# Patient Record
Sex: FEMALE | Race: BLACK OR AFRICAN AMERICAN | NOT HISPANIC OR LATINO | Employment: UNEMPLOYED | URBAN - METROPOLITAN AREA
[De-identification: names, ages, dates, MRNs, and addresses within clinical notes are randomized per-mention and may not be internally consistent; named-entity substitution may affect disease eponyms.]

---

## 2018-08-09 DIAGNOSIS — R10.2 PELVIC PAIN IN FEMALE: Primary | ICD-10-CM

## 2018-08-24 ENCOUNTER — HOSPITAL ENCOUNTER (OUTPATIENT)
Dept: RADIOLOGY | Facility: HOSPITAL | Age: 48
Discharge: HOME | End: 2018-08-24
Attending: OBSTETRICS & GYNECOLOGY
Payer: MEDICAID

## 2018-08-24 DIAGNOSIS — R10.2 PELVIC PAIN IN FEMALE: ICD-10-CM

## 2018-08-24 PROCEDURE — 76830 TRANSVAGINAL US NON-OB: CPT | Mod: TC

## 2018-08-24 PROCEDURE — 76856 US EXAM PELVIC COMPLETE: CPT | Mod: 26,,, | Performed by: RADIOLOGY

## 2018-08-24 PROCEDURE — 76830 TRANSVAGINAL US NON-OB: CPT | Mod: 26,,, | Performed by: RADIOLOGY

## 2018-08-28 ENCOUNTER — OFFICE VISIT (OUTPATIENT)
Dept: OBSTETRICS AND GYNECOLOGY | Facility: CLINIC | Age: 48
End: 2018-08-28
Payer: MEDICAID

## 2018-08-28 VITALS
SYSTOLIC BLOOD PRESSURE: 189 MMHG | DIASTOLIC BLOOD PRESSURE: 98 MMHG | BODY MASS INDEX: 23.6 KG/M2 | WEIGHT: 125 LBS | HEIGHT: 61 IN

## 2018-08-28 DIAGNOSIS — D25.1 INTRAMURAL, SUBMUCOUS, AND SUBSEROUS LEIOMYOMA OF UTERUS: Primary | ICD-10-CM

## 2018-08-28 DIAGNOSIS — D25.2 INTRAMURAL, SUBMUCOUS, AND SUBSEROUS LEIOMYOMA OF UTERUS: Primary | ICD-10-CM

## 2018-08-28 DIAGNOSIS — D25.0 INTRAMURAL, SUBMUCOUS, AND SUBSEROUS LEIOMYOMA OF UTERUS: Primary | ICD-10-CM

## 2018-08-28 DIAGNOSIS — R10.2 PELVIC PAIN: ICD-10-CM

## 2018-08-28 PROCEDURE — 99203 OFFICE O/P NEW LOW 30 MIN: CPT | Mod: S$PBB,,, | Performed by: OBSTETRICS & GYNECOLOGY

## 2018-08-28 PROCEDURE — 99999 PR PBB SHADOW E&M-EST. PATIENT-LVL III: CPT | Mod: PBBFAC,,, | Performed by: OBSTETRICS & GYNECOLOGY

## 2018-08-28 PROCEDURE — 99213 OFFICE O/P EST LOW 20 MIN: CPT | Mod: PBBFAC | Performed by: OBSTETRICS & GYNECOLOGY

## 2018-08-28 RX ORDER — NIFEDIPINE 60 MG/1
TABLET, EXTENDED RELEASE ORAL
COMMUNITY

## 2018-08-28 RX ORDER — LISINOPRIL AND HYDROCHLOROTHIAZIDE 12.5; 2 MG/1; MG/1
TABLET ORAL
COMMUNITY

## 2018-08-28 RX ORDER — NIFEDIPINE 30 MG/1
TABLET, FILM COATED, EXTENDED RELEASE ORAL
COMMUNITY

## 2018-09-03 PROBLEM — D25.2 INTRAMURAL, SUBMUCOUS, AND SUBSEROUS LEIOMYOMA OF UTERUS: Status: ACTIVE | Noted: 2018-09-03

## 2018-09-03 PROBLEM — D25.0 INTRAMURAL, SUBMUCOUS, AND SUBSEROUS LEIOMYOMA OF UTERUS: Status: ACTIVE | Noted: 2018-09-03

## 2018-09-03 PROBLEM — D25.1 INTRAMURAL, SUBMUCOUS, AND SUBSEROUS LEIOMYOMA OF UTERUS: Status: ACTIVE | Noted: 2018-09-03

## 2018-09-03 NOTE — PROGRESS NOTES
"Cc:  Discuss pelvic sono    HPI:  48 yro G0 presents to discuss recent pelvic sono done for "pelvic pain."  Pt initially seen at [facility].  Pt speaks [place] and desires to use  as .  Pt states her pain is mostly flank and upper bilateral back, not really pelvic.  Pt does desire future pregnancy.  Pt states menses is not heavy or painful.    Sono:  FINDINGS:  Uterus measures 15 x 5.1 x 9 cm, the endometrium 4 mm.  There are 4 fibroids ranging in size from 9.2 cm to 4 cm.  Left ovary measures 4.4 x 3.8 x 4.2 cm and there is normal blood flow.  Right ovary measures 2.5 x 2.2 x 1.9 cm with normal blood flow.  There is a small amount of physiologic free fluid.  There is a simple cyst left ovary measuring 3.3 cm.      Impression     See above  Fibroid uterus otherwise no abnormality seen     Vitals:    08/28/18 1352   BP: (!) 189/98   Weight: 56.7 kg (125 lb)   Height: 5' 1" (1.549 m)     30 minute discussion  Pt states she is in fact not having any pelvic pain, no painful or heavy menses.  Pt enquiring about surgical removal of fibroids.  Discussed with pt since she is not having sx's, and likelihood of pregnancy would be very small, she truly has no indications for myomectomy.  I believe myomectomy would be of little benefit to this pt at this point in her life.    Assessment  Pelvic pain  Fibroids    Plan  No treatment needed for fibroids at this time  Pt to see PCP for better BP management  "

## 2019-07-03 ENCOUNTER — HOSPITAL ENCOUNTER (EMERGENCY)
Facility: HOSPITAL | Age: 49
Discharge: HOME | End: 2019-07-03
Attending: EMERGENCY MEDICINE
Payer: MEDICAID

## 2019-07-03 VITALS
OXYGEN SATURATION: 98 % | RESPIRATION RATE: 18 BRPM | BODY MASS INDEX: 24.56 KG/M2 | DIASTOLIC BLOOD PRESSURE: 75 MMHG | SYSTOLIC BLOOD PRESSURE: 119 MMHG | HEART RATE: 78 BPM | WEIGHT: 130 LBS | TEMPERATURE: 98 F

## 2019-07-03 DIAGNOSIS — S29.011A CHEST WALL MUSCLE STRAIN, INITIAL ENCOUNTER: Primary | ICD-10-CM

## 2019-07-03 DIAGNOSIS — M54.6 CHRONIC RIGHT-SIDED THORACIC BACK PAIN: ICD-10-CM

## 2019-07-03 DIAGNOSIS — G89.29 CHRONIC RIGHT-SIDED THORACIC BACK PAIN: ICD-10-CM

## 2019-07-03 PROCEDURE — 99284 EMERGENCY DEPT VISIT MOD MDM: CPT

## 2019-07-03 PROCEDURE — 25000003 PHARM REV CODE 250: Performed by: NURSE PRACTITIONER

## 2019-07-03 RX ORDER — METHOCARBAMOL 500 MG/1
1500 TABLET, FILM COATED ORAL
Status: COMPLETED | OUTPATIENT
Start: 2019-07-03 | End: 2019-07-03

## 2019-07-03 RX ORDER — MELOXICAM 7.5 MG/1
7.5 TABLET ORAL ONCE
Status: COMPLETED | OUTPATIENT
Start: 2019-07-03 | End: 2019-07-03

## 2019-07-03 RX ORDER — METHOCARBAMOL 750 MG/1
1500 TABLET, FILM COATED ORAL 3 TIMES DAILY
Qty: 30 TABLET | Refills: 0 | Status: SHIPPED | OUTPATIENT
Start: 2019-07-03 | End: 2019-07-08

## 2019-07-03 RX ORDER — MELOXICAM 7.5 MG/1
7.5 TABLET ORAL DAILY
Qty: 30 TABLET | Refills: 0 | Status: SHIPPED | OUTPATIENT
Start: 2019-07-03 | End: 2019-08-02

## 2019-07-03 RX ADMIN — METHOCARBAMOL TABLETS 1500 MG: 500 TABLET, COATED ORAL at 10:07

## 2019-07-03 RX ADMIN — MELOXICAM 7.5 MG: 7.5 TABLET ORAL at 10:07

## 2019-07-03 NOTE — ED TRIAGE NOTES
The pt states her right sided rib pain a few months ago. Denies fever, n/v/d, burning upon urination and vaginal discharge. Reports back pain when she is sleeping as well.

## 2019-07-03 NOTE — ED PROVIDER NOTES
Encounter Date: 7/3/2019    SCRIBE #1 NOTE: I, Gregorio Handy, am scribing for, and in the presence of, Maria Elena Juan NP. Other sections scribed: HPI, ANDERSON.       History     Chief Complaint   Patient presents with    rib pain     Reports having righ sided rib pain, and david pain for past week. Denies trauma, and SOB     This is a 49 y.o. female who presents to the ED complaining of right sided rib pain that began one month ago and became worse today (7/3/19). The pain is constant, radiates to the lower back, and described as cramping and rated 8/10 at the time of the physical exam. Denies trauma, fever, chest pain, cough, and SOB. Pain worsens when the pt sleeps, however symptoms alleviate when she rests. She has a physically active job as a . She has taken no medications for these symptoms. She has had this before but she notes the symptoms are worse this time. The pt does not smoke. Denies any drug or alcohol use. NKDA. No PSHx.    The history is provided by the spouse and medical records. The history is limited by a language barrier. No  was used.     Review of patient's allergies indicates:  No Known Allergies  Past Medical History:   Diagnosis Date    Hypertension      History reviewed. No pertinent surgical history.  History reviewed. No pertinent family history.  Social History     Tobacco Use    Smoking status: Never Smoker    Smokeless tobacco: Never Used   Substance Use Topics    Alcohol use: No     Frequency: Never    Drug use: No     Review of Systems   Constitutional: Negative for chills and fever.   HENT: Negative for congestion.    Respiratory: Negative for cough and shortness of breath.    Cardiovascular: Negative for chest pain.   Gastrointestinal: Negative for abdominal pain, nausea and vomiting.   Genitourinary: Negative for dysuria.   Musculoskeletal: Positive for back pain and myalgias.   Skin: Negative for rash.   Neurological: Negative for dizziness, syncope and  "light-headedness.   Psychiatric/Behavioral: Negative for confusion.   All other systems reviewed and are negative.      Physical Exam     Initial Vitals [07/03/19 0959]   BP Pulse Resp Temp SpO2   (!) 166/90 87 18 98.1 °F (36.7 °C) 100 %      MAP       --         Physical Exam    Nursing note and vitals reviewed.  Constitutional: She appears well-developed and well-nourished. She is not diaphoretic. She is active and cooperative.  Non-toxic appearance. No distress.   Neck: Normal range of motion, full passive range of motion without pain and phonation normal. Neck supple. No stridor present. No tracheal tenderness, no spinous process tenderness and no muscular tenderness present. No tracheal deviation present.   Cardiovascular: S1 normal, S2 normal and normal heart sounds.   Pulmonary/Chest: Effort normal and breath sounds normal. No accessory muscle usage. No tachypnea and no bradypnea. No respiratory distress. She has no decreased breath sounds. She has no wheezes. She has no rhonchi. She has no rales.           Tenderness to the thoracic chest wall along the mid-axillary line extending to the thoracic paraspinous musculature. No midline spinal tenderness, crepitus, swelling, erythema, warmth.     Abdominal: Soft. She exhibits no distension and no mass. There is no tenderness. There is no rebound and no guarding.   Neurological: She is alert and oriented to person, place, and time. She has normal strength.   Skin: Skin is warm and dry. Capillary refill takes less than 2 seconds.   Psychiatric: She has a normal mood and affect.         ED Course   Procedures  Labs Reviewed - No data to display       Imaging Results    None          Medical Decision Making:   History:   Old Medical Records: I decided to obtain old medical records.  Old Records Summarized: records from clinic visits.       <> Summary of Records: Seen last year for "side" pain and was diagnosed fibroids.  No myomectomy recommended.  Initial " Assessment:   Pleasant, smiling, interactive  Differential Diagnosis:   Muscle strain, fibroid pain, pneumonia, costochondritis, pneumothorax, hemothorax, rib fracture, pulmonary contusion, PE  ED Management:  This is an urgent evaluation of a 49-year-old female that presents to the emergency room complaining of atraumatic right rib pain for 1 month.  Patient reports pain is worse with strenuous activity and certain movements; she states that she sleeps on this side every night and cannot rest well due to pain. She has point tenderness to right chest wall along the midaxillary line that extends to the thoracic back.  There is no midline spinal tenderness, neurological deficits, adventitious breath sounds.  She is not tachypneic or hypoxic.  Her heart rate is normal. She denies any respiratory symptoms, fevers, GI symptoms.  Her abdomen is soft and benign.  Overall, I suspect this is musculoskeletal in etiology.  I carefully considered but doubt rib fracture, pulmonary contusion, PE, pneumonia, pneumothorax, hemothorax, ACS.  Her PERC score is 0.  She is not currently taking anything for her symptoms.  Will place on NSAID and muscle relaxants for supportive care.  Recommended warm compresses or warm baths and to refrain from exertional activity until her symptoms improve.  To return to her primary care doctor within 1 week for re-evaluation of her symptoms if there is no significant improvement.  Return precautions were given for any new or worsening symptoms or concerns.    I, Maria Elena Juan, personally performed the services described in this documentation. All medical record entries made by the scribe were at my direction and in my presence.  I have reviewed the chart and agree that the record reflects my personal performance and is accurate and complete.                  Attending Attestation:           Physician Attestation for Scribe:  Physician Attestation Statement for Scribe #1: I, reviewed documentation, as  scribed by Gregorio Handy in my presence, and it is both accurate and complete.                    Clinical Impression:       ICD-10-CM ICD-9-CM   1. Chest wall muscle strain, initial encounter S29.011A 848.8   2. Chronic right-sided thoracic back pain M54.6 724.1    G89.29 338.29         Disposition:   Disposition: Discharged  Condition: Stable    I, Maria Elena Juan, personally performed the services described in this documentation. All medical record entries made by the scribe were at my direction and in my presence.  I have reviewed the chart and agree that the record reflects my personal performance and is accurate and complete                       Maria Elena Juan NP  07/03/19 1339

## 2019-07-03 NOTE — DISCHARGE INSTRUCTIONS
Try to get plenty of rest and stay well-hydrated on these medications.  Try to avoid long periods of standing, sitting, heavy lifting, or straining until your back pain resolves.    You can use cool or warm compresses over the affected area for relief.    Take MOBIC for pain and inflammation once a day.  You can take ROBAXIN as a muscle relaxant up to 3 times a day.  Please do not take ROBAXIN while working, drinking alcohol, driving/operating heavy machinery, swimming. It may cause drowsiness, impair judgment, and reduce physical capabilities.    Follow up with your primary care doctor in 1 week if symptoms have not significantly improved.    Return to ER for any new or worsening symptoms.    Kasey parks anpil repo epi rete byen idrate neris medikaman sa yo. Kasey craft peryòd long, kanpe, sascha, calvin tinsley, oswa tension jiskaske doulè nan do ou rezoud.    Ou ka sèvi ak [place] cho neris zòn ki afekte a gurinder soulajman.    Pran MOBIC gurinder doulè ak enflamasyon yon fwa pa tim. Ou ka pran ROBAXIN kòm yon rilaks nan misk jiska 3 fwa nan yon jounen. ines Vital pran ROBAXIN nicole martinez, bwè alkòl, kondwi / opere claudia rodriguezu, nachristin. Li ka lakòz somnolans, afekte jijman, epi redwi kapasite fizik.    Swiv avèk doktè premye swen ou nan 1 semèn si sentòm yo pa jaclyn amelyore siyifikativman.    Retounen nan ER gurinder nenpòt ki nouvo oswa renata pi grav sentòm yo.

## 2022-04-07 ENCOUNTER — HOSPITAL ENCOUNTER (OUTPATIENT)
Dept: RADIOLOGY | Facility: HOSPITAL | Age: 52
Discharge: HOME | End: 2022-04-07
Attending: NURSE PRACTITIONER
Payer: COMMERCIAL

## 2022-04-07 VITALS — WEIGHT: 130 LBS | HEIGHT: 61 IN | BODY MASS INDEX: 24.55 KG/M2

## 2022-04-07 DIAGNOSIS — Z12.31 ENCOUNTER FOR SCREENING MAMMOGRAM FOR MALIGNANT NEOPLASM OF BREAST: ICD-10-CM

## 2022-04-07 PROCEDURE — 77063 BREAST TOMOSYNTHESIS BI: CPT | Mod: 26,,, | Performed by: RADIOLOGY

## 2022-04-07 PROCEDURE — 77063 BREAST TOMOSYNTHESIS BI: CPT | Mod: TC

## 2022-04-07 PROCEDURE — 77067 MAMMO DIGITAL SCREENING BILAT WITH TOMO: ICD-10-PCS | Mod: 26,,, | Performed by: RADIOLOGY

## 2022-04-07 PROCEDURE — 77063 MAMMO DIGITAL SCREENING BILAT WITH TOMO: ICD-10-PCS | Mod: 26,,, | Performed by: RADIOLOGY

## 2022-04-07 PROCEDURE — 77067 SCR MAMMO BI INCL CAD: CPT | Mod: 26,,, | Performed by: RADIOLOGY

## 2022-05-19 ENCOUNTER — HOSPITAL ENCOUNTER (OUTPATIENT)
Dept: RADIOLOGY | Facility: HOSPITAL | Age: 52
Discharge: HOME | End: 2022-05-19
Attending: NURSE PRACTITIONER
Payer: COMMERCIAL

## 2022-05-19 DIAGNOSIS — R10.9 UNSPECIFIED ABDOMINAL PAIN: ICD-10-CM

## 2022-05-19 PROCEDURE — 72070 XR THORACIC SPINE AP LATERAL: ICD-10-PCS | Mod: 26,,, | Performed by: RADIOLOGY

## 2022-05-19 PROCEDURE — 74019 RADEX ABDOMEN 2 VIEWS: CPT | Mod: 26,,, | Performed by: RADIOLOGY

## 2022-05-19 PROCEDURE — 72070 X-RAY EXAM THORAC SPINE 2VWS: CPT | Mod: 26,,, | Performed by: RADIOLOGY

## 2022-05-19 PROCEDURE — 72070 X-RAY EXAM THORAC SPINE 2VWS: CPT | Mod: TC,FY

## 2022-05-19 PROCEDURE — 74019 RADEX ABDOMEN 2 VIEWS: CPT | Mod: TC,FY

## 2022-05-19 PROCEDURE — 74019 XR ABDOMEN FLAT AND ERECT: ICD-10-PCS | Mod: 26,,, | Performed by: RADIOLOGY

## 2024-07-31 DIAGNOSIS — Z12.31 ENCOUNTER FOR SCREENING MAMMOGRAM FOR MALIGNANT NEOPLASM OF BREAST: Primary | ICD-10-CM
